# Patient Record
Sex: MALE | Race: WHITE
[De-identification: names, ages, dates, MRNs, and addresses within clinical notes are randomized per-mention and may not be internally consistent; named-entity substitution may affect disease eponyms.]

---

## 2020-11-06 ENCOUNTER — HOSPITAL ENCOUNTER (EMERGENCY)
Dept: HOSPITAL 72 - EMR | Age: 25
Discharge: HOME | End: 2020-11-06
Payer: COMMERCIAL

## 2020-11-06 VITALS — HEIGHT: 65 IN | BODY MASS INDEX: 19.49 KG/M2 | WEIGHT: 117 LBS

## 2020-11-06 VITALS — DIASTOLIC BLOOD PRESSURE: 87 MMHG | SYSTOLIC BLOOD PRESSURE: 130 MMHG

## 2020-11-06 VITALS — DIASTOLIC BLOOD PRESSURE: 79 MMHG | SYSTOLIC BLOOD PRESSURE: 126 MMHG

## 2020-11-06 VITALS — SYSTOLIC BLOOD PRESSURE: 126 MMHG | DIASTOLIC BLOOD PRESSURE: 79 MMHG

## 2020-11-06 DIAGNOSIS — R30.0: ICD-10-CM

## 2020-11-06 DIAGNOSIS — R59.1: Primary | ICD-10-CM

## 2020-11-06 LAB
ADD MANUAL DIFF: NO
ALBUMIN SERPL-MCNC: 4 G/DL (ref 3.4–5)
ALBUMIN/GLOB SERPL: 1.2 {RATIO} (ref 1–2.7)
ALP SERPL-CCNC: 109 U/L (ref 46–116)
ALT SERPL-CCNC: 39 U/L (ref 12–78)
ANION GAP SERPL CALC-SCNC: 8 MMOL/L (ref 5–15)
APPEARANCE UR: CLEAR
APTT BLD: 29 SEC (ref 23–33)
APTT PPP: (no result) S
AST SERPL-CCNC: 26 U/L (ref 15–37)
BASOPHILS NFR BLD AUTO: 2.4 % (ref 0–2)
BILIRUB SERPL-MCNC: 0.2 MG/DL (ref 0.2–1)
BUN SERPL-MCNC: 18 MG/DL (ref 7–18)
CALCIUM SERPL-MCNC: 8.7 MG/DL (ref 8.5–10.1)
CHLORIDE SERPL-SCNC: 106 MMOL/L (ref 98–107)
CO2 SERPL-SCNC: 30 MMOL/L (ref 21–32)
CREAT SERPL-MCNC: 1.1 MG/DL (ref 0.55–1.3)
EOSINOPHIL NFR BLD AUTO: 3.6 % (ref 0–3)
ERYTHROCYTE [DISTWIDTH] IN BLOOD BY AUTOMATED COUNT: 11.9 % (ref 11.6–14.8)
GLOBULIN SER-MCNC: 3.3 G/DL
GLUCOSE UR STRIP-MCNC: NEGATIVE MG/DL
HCT VFR BLD CALC: 44.9 % (ref 42–52)
HGB BLD-MCNC: 15.2 G/DL (ref 14.2–18)
INR PPP: 1 (ref 0.9–1.1)
KETONES UR QL STRIP: NEGATIVE
LEUKOCYTE ESTERASE UR QL STRIP: NEGATIVE
LYMPHOCYTES NFR BLD AUTO: 41.7 % (ref 20–45)
MCV RBC AUTO: 92 FL (ref 80–99)
MONOCYTES NFR BLD AUTO: 6.1 % (ref 1–10)
NEUTROPHILS NFR BLD AUTO: 46.2 % (ref 45–75)
NITRITE UR QL STRIP: NEGATIVE
PH UR STRIP: 6.5 [PH] (ref 4.5–8)
PLATELET # BLD: 277 K/UL (ref 150–450)
POTASSIUM SERPL-SCNC: 4.3 MMOL/L (ref 3.5–5.1)
PROT UR QL STRIP: NEGATIVE
RBC # BLD AUTO: 4.86 M/UL (ref 4.7–6.1)
SODIUM SERPL-SCNC: 144 MMOL/L (ref 136–145)
SP GR UR STRIP: 1.01 (ref 1–1.03)
UROBILINOGEN UR-MCNC: NORMAL MG/DL (ref 0–1)
WBC # BLD AUTO: 6.5 K/UL (ref 4.8–10.8)

## 2020-11-06 PROCEDURE — 80053 COMPREHEN METABOLIC PANEL: CPT

## 2020-11-06 PROCEDURE — 81003 URINALYSIS AUTO W/O SCOPE: CPT

## 2020-11-06 PROCEDURE — 74177 CT ABD & PELVIS W/CONTRAST: CPT

## 2020-11-06 PROCEDURE — 85610 PROTHROMBIN TIME: CPT

## 2020-11-06 PROCEDURE — 85025 COMPLETE CBC W/AUTO DIFF WBC: CPT

## 2020-11-06 PROCEDURE — 85730 THROMBOPLASTIN TIME PARTIAL: CPT

## 2020-11-06 PROCEDURE — 99284 EMERGENCY DEPT VISIT MOD MDM: CPT

## 2020-11-06 PROCEDURE — 36415 COLL VENOUS BLD VENIPUNCTURE: CPT

## 2020-11-06 PROCEDURE — 86703 HIV-1/HIV-2 1 RESULT ANTBDY: CPT

## 2020-11-06 NOTE — DIAGNOSTIC IMAGING REPORT
EXAM:

  CT Abdomen and Pelvis With Intravenous Contrast

 

CLINICAL HISTORY:

  25-year-old male presents for increased right-sided pelvic pain.  

Reports having onset of symptoms approximately 3 weeks ago.  This 

gradually become more painful.  Reports having some pain to the right leg.

  Had previous testing for gonorrhea chlamydia which was negative.  

Denies any fever.  Reports having some dysuria which had resolved.

Normal

TECHNIQUE:

  Axial computed tomography images of the abdomen and pelvis with 

intravenous contrast.  CTDI is 3.10 mGy and DLP is 186.20 mGy-cm.  One or 

more of the following dose reduction techniques were used: automated 

exposure control, adjustment of the mA and/or kV according to patient 

size, use of iterative reconstruction technique.

  Coronal and sagittal reformatted images were created and reviewed.

 

COMPARISON:

  No relevant prior studies available.

 

FINDINGS:

  Lung bases:  Unremarkable.  No mass.  No consolidation.

 

 ABDOMEN:

  Liver:  Unremarkable.  No mass.

  Gallbladder and bile ducts:  Unremarkable.  No calcified stones.  No 

ductal dilation.

  Pancreas:  Unremarkable.  No mass.  No ductal dilation.

  Spleen:  Unremarkable.  No splenomegaly.

  Adrenals:  Unremarkable.  No mass.

  Kidneys and ureters:  Unremarkable.  No solid mass.  No hydronephrosis.

  Stomach and bowel:  Unremarkable.  No obstruction.  No mucosal 

thickening.

 

 PELVIS:

  Appendix:  Normal appendix.

  Bladder:  Unremarkable.  No mass.

  Reproductive:  Unremarkable as visualized.

 

 ABDOMEN and PELVIS:

  Intraperitoneal space:  Unremarkable.  No free air.  No significant 

fluid collection.

  Bones/joints:  No acute fracture.  No dislocation.

  Soft tissues:  Unremarkable.

  Vasculature:  Unremarkable.  No abdominal aortic aneurysm.

  Lymph nodes:  Unremarkable.  No enlarged lymph nodes.

 

IMPRESSION:     

  No acute findings in the abdomen or pelvis.

## 2020-11-06 NOTE — NUR
ED Nurse Note:



Pt ambulated to ED from home d/t R groin swelling that has been goiing for 3 
weeks accompanied  by 3/10 pain. Pt is AOx4, calm and cooperative to care, VSS, 
on RA, afebrile on triage.

## 2021-07-16 NOTE — EMERGENCY ROOM REPORT
History of Present Illness


General


Chief Complaint:  Pelvic Pain


Source:  Patient





Present Illness


HPI


Is a 25-year-old male presents for increased right-sided pelvic pain.  Reports 

having onset of symptoms approximately 3 weeks ago.  This gradually become more 

painful.  Reports having some pain to the right leg.  Had previous testing for 

gonorrhea chlamydia which was negative.  Denies any fever.  Reports having some 

dysuria which had resolved.


Allergies:  


Coded Allergies:  


     No Known Allergies (Unverified , 11/6/20)





COVID-19 Screening


Contact w/high risk pt:  No


Experienced COVID-19 symptoms?:  No


COVID-19 Testing performed PTA:  No





Patient History


Reviewed Nursing Documentation:  PMH: Agreed; PSxH: Agreed





Nursing Documentation-PM


Past Medical History:  No Stated History





Review of Systems


All Other Systems:  negative except mentioned in HPI





Physical Exam





Vital Signs








  Date Time  Temp Pulse Resp B/P (MAP) Pulse Ox O2 Delivery O2 Flow Rate FiO2


 


11/6/20 16:24 97.7 93 17 130/87 (101) 99 Room Air  








Sp02 EP Interpretation:  reviewed, normal


General Appearance:  normal inspection, well appearing, no apparent distress, 

alert


Head:  atraumatic


ENT:  normal ENT inspection, hearing grossly normal, normal voice


Neck:  normal inspection, full range of motion, supple, no bony tend


Respiratory:  normal inspection, lungs clear, normal breath sounds, no 

respiratory distress, no retraction, no wheezing


Cardiovascular #1:  regular rate, rhythm, no edema


Gastrointestinal:  normal inspection, normal bowel sounds, non tender, soft, no 

guarding, no hernia


Genitourinary:  no CVA tenderness


Musculoskeletal:  normal inspection, back normal, decreased range of motion, 

normal range of motion


Neurologic:  alert, motor strength/tone normal, CNs III-XII nml as tested, 

oriented x3, responsive, speech normal, normal inspection


Psychiatric:  normal inspection, judgement/insight normal, mood/affect normal





Medical Decision Making


Diagnostic Impression:  


   Primary Impression:  


   Lymphadenopathy


ER Course


Patient presented for right-sided inguinal pain.  Differential diagnosis include

was not limited to femoral hernia, lymphadenopathy, leukemia among others.  

Because of complexity of patient's case laboratory tests and imaging studies 

were ordered.Patient's white blood count was normal.  A CT imaging did not show 

any evidence of acute pathology.  Given patient's history there is some concern 

for HIV and so HIV testing was ordered.  Patient was advised return precautions.

 He was empirically given a prescription for doxycycline.  He was advised to 

follow-up with his primary care physician for reassessment.  He is to return if 

worse.  This medical record is generated with Dragon transcription software. 

There may be some transcription discrepancies related to use of this software





Labs








Test


 11/6/20


17:18 11/6/20


17:21


 


White Blood Count


 6.5 K/UL


(4.8-10.8) 





 


Red Blood Count


 4.86 M/UL


(4.70-6.10) 





 


Hemoglobin


 15.2 G/DL


(14.2-18.0) 





 


Hematocrit


 44.9 %


(42.0-52.0) 





 


Mean Corpuscular Volume 92 FL (80-99)  


 


Mean Corpuscular Hemoglobin


 31.3 PG


(27.0-31.0) 





 


Mean Corpuscular Hemoglobin


Concent 33.9 G/DL


(32.0-36.0) 





 


Red Cell Distribution Width


 11.9 %


(11.6-14.8) 





 


Platelet Count


 277 K/UL


(150-450) 





 


Mean Platelet Volume


 5.4 FL


(6.5-10.1) 





 


Neutrophils (%) (Auto)


 46.2 %


(45.0-75.0) 





 


Lymphocytes (%) (Auto)


 41.7 %


(20.0-45.0) 





 


Monocytes (%) (Auto)


 6.1 %


(1.0-10.0) 





 


Eosinophils (%) (Auto)


 3.6 %


(0.0-3.0) 





 


Basophils (%) (Auto)


 2.4 %


(0.0-2.0) 





 


Prothrombin Time


 11.2 SEC


(9.30-11.50) 





 


Prothromb Time International


Ratio 1.0 (0.9-1.1) 


 





 


Activated Partial


Thromboplast Time 29 SEC (23-33) 


 





 


Sodium Level


 144 MMOL/L


(136-145) 





 


Potassium Level


 4.3 MMOL/L


(3.5-5.1) 





 


Chloride Level


 106 MMOL/L


() 





 


Carbon Dioxide Level


 30 MMOL/L


(21-32) 





 


Anion Gap


 8 mmol/L


(5-15) 





 


Blood Urea Nitrogen


 18 mg/dL


(7-18) 





 


Creatinine


 1.1 MG/DL


(0.55-1.30) 





 


Estimat Glomerular Filtration


Rate > 60 mL/min


(>60) 





 


Glucose Level


 79 MG/DL


() 





 


Calcium Level


 8.7 MG/DL


(8.5-10.1) 





 


Total Bilirubin


 0.2 MG/DL


(0.2-1.0) 





 


Aspartate Amino Transf


(AST/SGOT) 26 U/L (15-37) 


 





 


Alanine Aminotransferase


(ALT/SGPT) 39 U/L (12-78) 


 





 


Alkaline Phosphatase


 109 U/L


() 





 


Total Protein


 7.3 G/DL


(6.4-8.2) 





 


Albumin


 4.0 G/DL


(3.4-5.0) 





 


Globulin 3.3 g/dL  


 


Albumin/Globulin Ratio 1.2 (1.0-2.7)  


 


Urine Color  Pale yellow 


 


Urine Appearance  Clear 


 


Urine pH  6.5 (4.5-8.0) 


 


Urine Specific Gravity


 


 1.015


(1.005-1.035)


 


Urine Protein


 


 Negative


(NEGATIVE)


 


Urine Glucose (UA)


 


 Negative


(NEGATIVE)


 


Urine Ketones


 


 Negative


(NEGATIVE)


 


Urine Blood


 


 Negative


(NEGATIVE)


 


Urine Nitrite


 


 Negative


(NEGATIVE)


 


Urine Bilirubin


 


 Negative


(NEGATIVE)


 


Urine Urobilinogen


 


 Normal MG/DL


(0.0-1.0)


 


Urine Leukocyte Esterase


 


 Negative


(NEGATIVE)











Last Vital Signs








  Date Time  Temp Pulse Resp B/P (MAP) Pulse Ox O2 Delivery O2 Flow Rate FiO2


 


11/6/20 16:24 97.7 93 17 130/87 (101) 99 Room Air  








Status:  improved


Disposition:  HOME, SELF-CARE


Condition:  Stable


Scripts


Doxycycline Hyclate* (VIBRAMYCIN*) 100 Mg Capsule


100 MG ORAL EVERY 12 HOURS, #14 CAP 0 Refills


   Prov: Chris Amato MD         11/6/20


Referrals:  


[facility],REFERRING (PCP)











Chris Amato MD                Nov 6, 2020 17:12 See alternate encounter for additional details.    Selena Underwood LPN